# Patient Record
Sex: FEMALE | Race: WHITE | NOT HISPANIC OR LATINO | Employment: STUDENT | URBAN - METROPOLITAN AREA
[De-identification: names, ages, dates, MRNs, and addresses within clinical notes are randomized per-mention and may not be internally consistent; named-entity substitution may affect disease eponyms.]

---

## 2024-09-05 ENCOUNTER — HOSPITAL ENCOUNTER (OUTPATIENT)
Dept: RADIOLOGY | Facility: CLINIC | Age: 24
Discharge: HOME | End: 2024-09-05
Payer: COMMERCIAL

## 2024-09-05 ENCOUNTER — APPOINTMENT (OUTPATIENT)
Dept: ORTHOPEDIC SURGERY | Facility: CLINIC | Age: 24
End: 2024-09-05
Payer: COMMERCIAL

## 2024-09-05 DIAGNOSIS — M25.561 RIGHT KNEE PAIN, UNSPECIFIED CHRONICITY: ICD-10-CM

## 2024-09-05 DIAGNOSIS — M22.2X1 PATELLOFEMORAL SYNDROME OF RIGHT KNEE: ICD-10-CM

## 2024-09-05 DIAGNOSIS — S83.281A TEAR OF LATERAL MENISCUS OF RIGHT KNEE, CURRENT, INITIAL ENCOUNTER: Primary | ICD-10-CM

## 2024-09-05 PROCEDURE — 73564 X-RAY EXAM KNEE 4 OR MORE: CPT | Mod: RT

## 2024-09-05 PROCEDURE — 73564 X-RAY EXAM KNEE 4 OR MORE: CPT | Mod: RIGHT SIDE | Performed by: RADIOLOGY

## 2024-09-05 PROCEDURE — 99204 OFFICE O/P NEW MOD 45 MIN: CPT | Performed by: FAMILY MEDICINE

## 2024-09-05 RX ORDER — BUPROPION HYDROCHLORIDE 300 MG/1
1 TABLET ORAL DAILY
COMMUNITY
Start: 2019-04-02

## 2024-09-05 RX ORDER — EPINEPHRINE 0.3 MG/.3ML
0.3 INJECTION SUBCUTANEOUS
COMMUNITY
Start: 2016-03-29

## 2024-09-05 RX ORDER — SERTRALINE HYDROCHLORIDE 100 MG/1
150 TABLET, FILM COATED ORAL DAILY
COMMUNITY
Start: 2019-01-08

## 2024-09-05 ASSESSMENT — PAIN DESCRIPTION - DESCRIPTORS: DESCRIPTORS: ACHING;SHOOTING

## 2024-09-05 ASSESSMENT — PAIN - FUNCTIONAL ASSESSMENT: PAIN_FUNCTIONAL_ASSESSMENT: 0-10

## 2024-09-05 ASSESSMENT — PAIN SCALES - GENERAL: PAINLEVEL_OUTOF10: 5 - MODERATE PAIN

## 2024-09-05 NOTE — PROGRESS NOTES
New patient right knee pain    Subjective    Patient ID: Aisha Hutchins is a 24 y.o. female.    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  She is in the marching band at [employer] and is noticed that during football season the pain is worse.  She rates it as a 5 out of 10 currently.  It is mostly in the anterior and medial aspects of the knee when asked to point where the pain is.  She states that she twisted the knee initially that led to the discomfort.  She has not done any formal physical therapy, but has taken ibuprofen occasionally when needed.  It does not really bother her out of season.  She denies any radicular type pain.    Objective   Musculoskeletal: Right knee-there is no deformity or asymmetry when compared to the opposite side.  No significant effusion, warmth, redness, or skin changes.  She has some tenderness to palpation over the medial joint line and medial patella facet.  There is crepitus with movement.  She has a positive J sign.  She has full range of motion.  All cruciate and collateral ligaments appear to be intact with a negative Lachman's, negative anterior and posterior drawer, negative varus and valgus stress.  She has a slightly weak VMO.  Steinmann's and Apley's are both negative.  She is neurovascularly intact distally.    Image Results:  XR knee right 4+ views  Narrative: Interpreted By:  Rosendo Barros,   STUDY:  XR KNEE RIGHT 4+ VIEWS; ;  9/5/2024 1:20 pm      INDICATION:  Signs/Symptoms:pain.      ,M25.561 Pain in right knee      COMPARISON:  None.      ACCESSION NUMBER(S):  [id number]      ORDERING CLINICIAN:  GLENDA SANTAMARIA      FINDINGS:  Right knee, four views      There is no fracture. There is no dislocation. There are no  degenerative changes. There is no effusion seen. There is no soft  tissue abnormality seen.      Impression: Normal  radiographs of the right knee          MACRO:  None      Signed by: Rosendo Barros 9/6/2024 5:35 PM  Dictation workstation:   [id number]      Assessment/Plan   Encounter Diagnoses:  Tear of lateral meniscus of right knee, current, initial encounter    Right knee pain, unspecified chronicity    Patellofemoral syndrome of right knee    Orders Placed This Encounter    XR knee right 4+ views    Referral to Physical Therapy     I do of some concern that she may have torn the meniscus given her twisting type injury.  We are going to try to treat this conservatively with formal physical therapy.  We are going to have her go to physical therapy while at school.  I would like her to start around-the-clock over-the-counter NSAIDs for 2 weeks when she starts formal physical therapy.  We will have a low threshold for getting further imaging if we do not see any significant improvement over the next few weeks.  She will follow-up as needed.  All of her questions were answered and she agrees with treatment plan.    ** Please excuse any errors in grammar or translation related to this dictation. Voice recognition software was utilized to prepare this document. **    Sixto Summers M.D.  Clinical , Division of Sports Medicine  Primary Care Sports Medicine  Department of Orthopedic Surgery  [facility] [place]

## 2025-02-13 ENCOUNTER — OFFICE VISIT (OUTPATIENT)
Dept: URGENT CARE | Age: 25
End: 2025-02-13
Payer: COMMERCIAL

## 2025-02-13 DIAGNOSIS — K08.89 PAIN IN A TOOTH OR TEETH: Primary | ICD-10-CM

## 2025-02-13 RX ORDER — AMOXICILLIN AND CLAVULANATE POTASSIUM 875; 125 MG/1; MG/1
875 TABLET, FILM COATED ORAL 2 TIMES DAILY
Qty: 20 TABLET | Refills: 0 | Status: SHIPPED | OUTPATIENT
Start: 2025-02-13 | End: 2025-02-23

## 2025-02-13 NOTE — PROGRESS NOTES
Urgent Care Virtual Video Visit    Communication Mode: [ ___ ] Phone [ x ] Video    Patient Location: [place]  Provider Location: [facility]    I have communicated my name. The patient's identity and physical location were verified at the time of this visit. Either the patient or their legal representative has been informed of the risks and benefits of -- and alternatives to -- treatment through a remote evaluation and consents to proceed with the evaluation remotely. This visit was conducted using video and audio.  **If the visit changes from video + audio to audio only (due to the patient being unable to connect to video), please change the last sentence to state audio only.    Video visit completed with realtime synchronous video/audio connection. Informed consent was obtained from the patient. Patient was made aware that my evaluation and diagnosis are limited due to the fact that we are not in the same room during the interview and that this is a virtual encounter that took place via videoconferencing. Patient verbalized understanding.       ASSESMENT AND PLAN  Patient right lower tooth pain radiation to her right ear is potentially 2/2 dental pathology vs TMJ vs less likely sinusitis or other etiology. Vitals unavailable. Encourage setting an appointment with dentist. Through shared decision making, patient preference, after discussing risks and benefits, will send Augmentin    Diagnoses and all orders for this visit:  Pain in a tooth or teeth  -     amoxicillin-pot clavulanate (Augmentin) 875-125 mg tablet; Take 1 tablet (875 mg) by mouth 2 times a day for 10 days.        Patient disposition: Home    Electronically signed by Puneet Lowery MD  9:12 AM      HPI:  Patient reports teeth pain for ~5 days  Ibuprofen was working and it is no longer helping  Right lower wisdom tooth pain  Denies fever  Denies pus  Unknown if grinds teeth  Notes a little bit of a frontal headache  Notes slight  cough with phlegm  Denies fever  Denies trismus  Denies floor of mouth pain  Denies known strep or mono    PHYSICAL EXAM:  CONSTITUTIONAL: non-toxic appearing, no evidence of diaphoresis   HEENT: EOMi bilaterally  MOUTH: no apparent pus  No apparent abscess along the right lower gum line   No apparent drooling from the mouth  NECK: normal ROM  CV: appears well perfused, non-cyanotic  RESPIRATORY: no apparent respiratory distress, no evidence of accessory muscle use, no nasal flaring, no tripoding  SKIN: no evidence of any rash or lesion on visible skin   PSYCH: pleasant     Limited due to camera view/lighting     VITALS    Pulse Ox: unavailable  BP: blood pressure cuff unavailable   Pulse: unavailable   Temp: unavailable